# Patient Record
Sex: FEMALE
[De-identification: names, ages, dates, MRNs, and addresses within clinical notes are randomized per-mention and may not be internally consistent; named-entity substitution may affect disease eponyms.]

---

## 2019-01-11 ENCOUNTER — HOSPITAL ENCOUNTER (EMERGENCY)
Dept: HOSPITAL 14 - H.EROB2 | Age: 29
Discharge: HOME | End: 2019-01-11
Payer: COMMERCIAL

## 2019-01-11 VITALS
TEMPERATURE: 98.1 F | DIASTOLIC BLOOD PRESSURE: 78 MMHG | OXYGEN SATURATION: 98 % | SYSTOLIC BLOOD PRESSURE: 125 MMHG | HEART RATE: 94 BPM

## 2019-01-11 DIAGNOSIS — Z3A.39: ICD-10-CM

## 2019-01-11 DIAGNOSIS — O26.93: Primary | ICD-10-CM

## 2019-01-11 DIAGNOSIS — R10.2: ICD-10-CM

## 2019-01-11 NOTE — OBHP
===========================

Datetime: 01/11/2019 15:54

===========================

   

IP Adm Impression:  Term, intrauterine pregnancy; No Active Labor

IP Admit Plan:  Observation/Evaluation; Discharge home

Admit Comment, IP Provider:  pt was mnitored in [facility] and no cervical changes noted in over  hrs and l
ess pains D/C home with instrtuctions and will follow next wk in office 

   Call immediatly if increased pains ROM bleeding or UC Understands and agreed

Extremities - PN:  Normal

Abdomen - PN:  Abnormal

Back - PN:  Normal

Breast - PN:  Not Done

Lungs - PN:  Normal

Thyroid - PN:  Not Done

Neurologic - PN:  Not Done

HEENT - PN:  Normal

General - PN:  Normal

FHR - Baseline A Provider:  140/150

Membranes, Provider:  Intact

Contraction Comments Provider:  irreg

Comments, ACOG Physical Exam:  Abd gravid NT fundus at term NT, ext no calf tenderness

Pool Provider:  Negative

IP Prenatal Hx Assessment:  The Prenatal History has been Reviewed and is Current

EGA AdmitDate IP:  39.5

Vital Signs Provider:  Reviewed

IP Chief Complaint:  Uterine contractions

NICHD Variability Prov Fetus A:  Moderate 6-25bpm

NICHD Accel Fetus A IP Provider:  15X15

NICHD Decel Fetus A IP Provider:  None

Dilatation, Provider:  FT 

Effacement, Provider:  none

Station, Provider:  h

Genitourinary Exam:  Normal

DTRs - PN:  Normal

## 2019-01-17 ENCOUNTER — HOSPITAL ENCOUNTER (INPATIENT)
Dept: HOSPITAL 14 - H.L&D | Age: 29
LOS: 4 days | Discharge: HOME | End: 2019-01-21
Attending: SPECIALIST | Admitting: SPECIALIST
Payer: COMMERCIAL

## 2019-01-17 VITALS — BODY MASS INDEX: 35.9 KG/M2

## 2019-01-17 DIAGNOSIS — O36.63X0: ICD-10-CM

## 2019-01-17 DIAGNOSIS — O48.0: ICD-10-CM

## 2019-01-17 DIAGNOSIS — J45.909: ICD-10-CM

## 2019-01-17 DIAGNOSIS — O61.9: ICD-10-CM

## 2019-01-17 DIAGNOSIS — Z3A.40: ICD-10-CM

## 2019-01-17 LAB
BASOPHILS # BLD AUTO: 0 K/UL (ref 0–0.2)
BASOPHILS NFR BLD: 0.3 % (ref 0–2)
EOSINOPHIL # BLD AUTO: 0.1 K/UL (ref 0–0.7)
EOSINOPHIL NFR BLD: 1.1 % (ref 0–4)
ERYTHROCYTE [DISTWIDTH] IN BLOOD BY AUTOMATED COUNT: 15.7 % (ref 11.5–14.5)
HGB BLD-MCNC: 11.9 G/DL (ref 12–16)
LYMPHOCYTES # BLD AUTO: 2.5 K/UL (ref 1–4.3)
LYMPHOCYTES NFR BLD AUTO: 21.7 % (ref 20–40)
MCH RBC QN AUTO: 29.1 PG (ref 27–31)
MCHC RBC AUTO-ENTMCNC: 32.7 G/DL (ref 33–37)
MCV RBC AUTO: 89 FL (ref 81–99)
MONOCYTES # BLD: 0.6 K/UL (ref 0–0.8)
MONOCYTES NFR BLD: 5.3 % (ref 0–10)
NEUTROPHILS # BLD: 8.2 K/UL (ref 1.8–7)
NEUTROPHILS NFR BLD AUTO: 71.6 % (ref 50–75)
NRBC BLD AUTO-RTO: 0.1 % (ref 0–0)
PLATELET # BLD: 326 K/UL (ref 130–400)
PMV BLD AUTO: 9.4 FL (ref 7.2–11.7)
RBC # BLD AUTO: 4.1 MIL/UL (ref 3.8–5.2)
WBC # BLD AUTO: 11.4 K/UL (ref 4.8–10.8)

## 2019-01-17 PROCEDURE — 4A1HXCZ MONITORING OF PRODUCTS OF CONCEPTION, CARDIAC RATE, EXTERNAL APPROACH: ICD-10-PCS | Performed by: SPECIALIST

## 2019-01-18 RX ADMIN — SIMETHICONE CHEW TAB 80 MG SCH MG: 80 TABLET ORAL at 21:53

## 2019-01-18 RX ADMIN — CEFOXITIN SODIUM SCH MLS/HR: 1 INJECTION, SOLUTION INTRAVENOUS at 19:07

## 2019-01-18 NOTE — OBDS
===================================

DELIVERY PERSONNEL

===================================

   

Delivery Doctor:  JEMAL Denny MD

Circulator:  Radha Bryant RN

Anesthesiologist:  DR ERICA GONZALES

Resident:  Dr Ruelas OB FELLOW

   

===================================

MATERNAL INFORMATION

===================================

   

Delivery Anesthesia:  Spinal

Estimated  Blood Loss (ml):  800

Maternal Complications:  None

Provider Comments:  see dictated surgeons note

   

===================================

LABOR SUMMARY

===================================

   

EDC:  2019 00:00

No. Babies in Womb:  1

   

===================================

LABOR INFORMATION

===================================

   

Reason for Induction:  Postterm; Indicated by Fetal Testing

Cervical Ripening Agents:  Cervidil (Annotations: Inserted by Dr. Denny )

Oxytocin:  N/A

Group B Beta Strep:  Negative

Antibiotics # of Doses:  0

Antibiotics Time of Last Dose:  0

Steroids Given:  None

Reason Steroids Not Administered:  Not Applicable

   

===================================

MEMBRANES

===================================

   

Membranes Rupture Method:  Spontaneous

Rupture of Membranes:  2019 03:30

Length of Rupture (hrs):  7.98

Amniotic Fluid Color:  Clear

Amniotic Fluid Amount:  Large

Amniotic Fluid Odor:  Normal

   

===================================

STAGES OF LABOR

===================================

   

Stage 3 hrs:  0

Stage 3 min:  1

   

===================================

VAGINAL DELIVERY

===================================

   

Episiotomy:  None

Laceration Extension:  N/A

Laceration Type:  None

Laceration Repair:  Not Applicable

Sponge Count Correct:  Yes

Sharps Count Correct:  Yes

Count Comment:  count correct x3

   

===================================

CSECTION DELIVERY

===================================

   

Primary Indication:  Nonreassuring Fetal Status

Secondary Indication:  Failed Induction

CSection Urgency:  Elective

CSection Incidence:  Primary

Labor:  Labor

Elective:  Elective

CSection Incision:  Lower Uterine Transverse

Uterine Closure:  Double-layer closure

   

===================================

BABY A INFORMATION

===================================

   

Infant Delivery Date/Time:  2019 11:29

Method of Delivery:  

Born in Route :  No

:  N/A

Forceps:  N/A

Vacuum Extraction:  Successful

Shoulder Dystocia :  No

   

===================================

ASSISTED DELIVERY BABY A

===================================

   

Catheter Prior to Procedure:  Yes

Vacuum Number of Pulls:  0

Vacuum Number of PopOffs:  1

   

===================================

SHOULDER DYSTOCIA BABY A

===================================

   

Infant Delivery Date/Time:  2019 11:29

   

===================================

PRESENTATION/POSITION BABY A

===================================

   

Presentation:  Cephalic

Cephalic Presentation:  Vertex

Breech Presentation:  N/A

   

===================================

PLACENTA INFORMATION BABY A

===================================

   

Placenta Delivery Time :  2019 11:30

Placenta Method of Delivery:  Expressed

Placenta Status:  Delivered

   

===================================

APGAR SCORES BABY A

===================================

   

Heart Rate 1 min:  >100 bpm

Resp Effort 1 min:  Good Cry

Reflex Irritability 1 min:  Cough or Sneeze or Pulls Away

Muscle Tone 1 min:  Active Motion

Color 1 min:  Body Pink, Extremities Blue

APGAR SCORE 1 MIN:  9

Heart Rate 5 min:  >100 bpm

Resp Effort 5 min:  Good Cry

Reflex Irritability 5 min:  Cough or Sneeze or Pulls Away

Muscle Tone 5 min:  Active Motion

Color 5 min:  Body Pink, Extremities Blue

APGAR SCORE 5 MIN:  9

   

===================================

INFANT INFORMATION BABY A

===================================

   

Gestational Age at Delivery:  40.5

Gestational Status:  Term

Infant Outcome :  Liveborn

Infant Condition :  Stable

Infant Sex:  Male

   

===================================

IDENTIFICATION/MEDS BABY A

===================================

   

ID Band Number:  [id number]

ID Band Location:  Left Leg; Left Arm



   

===================================

WEIGHT/LENGTH BABY A

===================================

   

Infant Birthweight (gms):  3730

Infant Weight (lb):  8

Infant Weight (oz):  4

Infant Length Inches:  20.00

Infant Length cms:  50.8

   

===================================

CORD INFORMATION BABY A

===================================

   

No. Cord Vessels:  3

Nuchal Cord :  N/A

Nuchal Cord Other:  0

True Knot:  0

Cord Blood Taken:  Yes

Infant Suction:  Mouth; Nose

## 2019-01-19 LAB
ERYTHROCYTE [DISTWIDTH] IN BLOOD BY AUTOMATED COUNT: 15.9 % (ref 11.5–14.5)
HGB BLD-MCNC: 10.3 G/DL (ref 12–16)
MCH RBC QN AUTO: 28.9 PG (ref 27–31)
MCHC RBC AUTO-ENTMCNC: 33 G/DL (ref 33–37)
MCV RBC AUTO: 87.5 FL (ref 81–99)
PLATELET # BLD: 258 K/UL (ref 130–400)
RBC # BLD AUTO: 3.56 MIL/UL (ref 3.8–5.2)
WBC # BLD AUTO: 14.3 K/UL (ref 4.8–10.8)

## 2019-01-19 RX ADMIN — SIMETHICONE CHEW TAB 80 MG SCH MG: 80 TABLET ORAL at 21:00

## 2019-01-19 RX ADMIN — SIMETHICONE CHEW TAB 80 MG SCH MG: 80 TABLET ORAL at 09:39

## 2019-01-19 RX ADMIN — CEFOXITIN SODIUM SCH MLS/HR: 1 INJECTION, SOLUTION INTRAVENOUS at 01:05

## 2019-01-19 RX ADMIN — MULTIPLE VITAMINS W/ MINERALS TAB SCH TAB: TAB at 08:10

## 2019-01-19 RX ADMIN — SIMETHICONE CHEW TAB 80 MG SCH MG: 80 TABLET ORAL at 08:10

## 2019-01-19 RX ADMIN — SIMETHICONE CHEW TAB 80 MG SCH MG: 80 TABLET ORAL at 03:17

## 2019-01-19 RX ADMIN — CEFOXITIN SODIUM SCH MLS/HR: 1 INJECTION, SOLUTION INTRAVENOUS at 09:15

## 2019-01-19 RX ADMIN — SIMETHICONE CHEW TAB 80 MG SCH MG: 80 TABLET ORAL at 18:11

## 2019-01-19 NOTE — OBPPN
===========================

Datetime: 01/19/2019 12:50

===========================

   

PP Pain Prov:  Within normal limits

PP Pain Prov comment:  No SOB, chest or leg pains

PP Nausea Prov:  Denies

PP Flatus Prov:  Yes

PP BM Prov:  No

PP Nausea Prov comment:  voided well

PP Breasts Prov:  Normal

PP Lungs Prov:  Normal

PP Abdomen/Uterus Prov:  Abnormal

PP Lochia Prov:  Normal

PP Vulva/Perineum Prov:  Normal

PP CVA Tenderness Prov:  Normal

PP Extremities Prov:  Normal

PP C/S Incision Prov:  Normal

PP Breastfeeding Progress Prov:  Normal

PP Comments Phys Exam Prov:  breast nt, ne, breast feeding; Abd soft ND, fundus firm below the umb In
cision clean and dry no suppt or discharge sutures in placel  Ext no calf tenderness.  

PP Impression Prov:  Normal postpartum progression

PP Plan Prov:  Continue present management

PP Progress Note Prov:  OOB and ambulation, increase diet as tolerated.  Continue po care.

Vital Signs Provider PP:  Reviewed

## 2019-01-19 NOTE — OP
PROCEDURE DATE:  2019



PREOPERATIVE DIAGNOSES:

1.  Pregnancy at 40 plus weeks gestation.

2.  Nonreassuring fetal tracing.

3.  Failed induction.



POSTOPERATIVE DIAGNOSES:

1.  Pregnancy at 40 plus weeks gestation.

2.  Nonreassuring fetal tracing.

3.  Failed induction.



PROCEDURE PERFORMED:  Primary low transverse segment  section.



SURGEON:  Po Denny MD



ASSISTANT:  Niko Ruelas MD



TYPE OF ANESTHESIA:  Spinal.



ANESTHESIA ADMINISTERED BY:  Darion Garces MD



ESTIMATED BLOOD LOSS:  800 mL.



DRAINS USED:  None.



REPLACEMENT USED:  None.



FINDINGS:

1.  Delivered living boy baby, baby appears large for gestational age. 

Baby cried spontaneously.  Pediatrist in attendance.  Apgar score of 9 and

9.

2.  Amniotic fluid clear.

3.  Placenta complete and intact.

4.  Both tubes and ovaries appeared grossly within normal limits to

inspection bilaterally.



DESCRIPTION OF PROCEDURE:  The patient was taken to the operating room and

placed on the operating table in a supine position.  Following the

induction of spinal anesthesia, the Castle catheter was inserted into the

bladder and was draining clear fluid.  Venodyne boots were applied to both

legs and the abdomen was then draped and prepped in a usual sterile manner.

Anesthesia tested and found to be well secured and a Pfannenstiel incision

was then made using sharp dissection 2 fingerbreadths above the symphysis

pubis.  The incision was then extended down to subcutaneous tissue also

using sharp dissection.  At this time, we then proceeded to obtain

hemostasis by means of electrocoagulation.  The fascia was then identified,

was then entered in the midline.  Incision of the fascia was then extended

laterally on each direction.  Following this, the rectus muscle was then

identified, was then slit in the midline exposing the peritoneum. 

Peritoneal layer was then picked up using 2 Farhana clamps, retracted

superiorly and then entered using sharp dissection.  Incision of the

peritoneum was then extended superiorly and inferiorly under direct

visualization.  At this time, we then proceeded to identify the bladder,

which was then retracted inferiorly using a Navarro retractor.  The low

transverse segment of the uterus was then identified, was then entered

using sharp dissection.  Using blunt dissection, a bladder flap was then

created and retracted inferiorly using the same Navarro retractor. 

Following this, we then proceeded to make an incision in a low transverse

segment of the uterus.  Upon entering the uterine cavity, clear fluid noted

to be present.  The incision was then extended laterally in each direction

using bandage scissors.  Using amnioscopy procedure, a living baby boy was

then delivered.  The baby appears term, but large for gestational age.  The

baby cried spontaneously.  It was aspirated using bulb suction, the

umbilicus was then doubly clamped and cut, and the baby handled to the

pediatric personnel who was standing by.  Samples of cord blood were then

obtained and the placenta was then delivered complete and intact.  Edges of

the uterine incision were then secured using multiple T-clamps.  The uterus

had been exteriorized to provide better visualization.  The uterine cavity

was then thoroughly cleaned using moist lap pad.  The uterus was massaged

and contracted well.  The uterine incision was then approximated using 0

Vicryl suture in a continuous interlocking manner.  At this time, we then

proceeded to place a second layer also using 0 Vicryl suture in a

continuous manner.  Hemostasis checked and found to be well secured. 

Following this, the bladder flap was then approximated using a 2-0 Vicryl

in a continuous manner.  Following this, all operative areas checked,

hemostatically secured.  Free amniotic fluid and blood were evacuated from

the pelvic cavity  The pelvic cavity was then irrigated using saline

solution and the uterus was then allowed to retract back into its original

position.  At this time, all operative areas checked, hemostatically

secured.  Both tubes and ovaries appeared grossly within normal limits to

inspection bilaterally.  The peritoneum was then closed using 0 Vicryl

suture in a continuous manner.  Rectus muscle was then approximated in the

midline using interrupted 0 Vicryl suture.  Fascia was then identified and

was then approximated using 0 Vicryl suture in a continuous manner.  Fascia

was then checked and found to be free of defect.  Subcutaneous tissue was

then irrigated using saline solution and approximated using several

interrupted 2-0 plain sutures.  Skin was then approximated using a 3-0

Prolene in a subcuticular fashion.  Steri-Strips were then applied.  Clear

fluid noted to be present in the Castle bag at this time.  The patient

tolerated the procedure well.  There were no complications.  Sponge,

instrument, and needle count correct x3.  The patient now was then

transferred to the recovery room in satisfactory condition.



 



__________________________________________

Po Denny MD





DD:  2019 9:21:47

DT:  2019 10:06:30

Job # [id number]

## 2019-01-20 RX ADMIN — SIMETHICONE CHEW TAB 80 MG SCH MG: 80 TABLET ORAL at 21:09

## 2019-01-20 RX ADMIN — MULTIPLE VITAMINS W/ MINERALS TAB SCH TAB: TAB at 14:43

## 2019-01-20 RX ADMIN — SIMETHICONE CHEW TAB 80 MG SCH: 80 TABLET ORAL at 09:04

## 2019-01-20 RX ADMIN — SIMETHICONE CHEW TAB 80 MG SCH MG: 80 TABLET ORAL at 10:10

## 2019-01-20 RX ADMIN — SIMETHICONE CHEW TAB 80 MG SCH MG: 80 TABLET ORAL at 16:45

## 2019-01-20 NOTE — OBPPN
===========================

Datetime: 01/20/2019 12:06

===========================

   

PP Pain Prov:  Within normal limits

PP Pain Prov comment:  no SOB, chest or leg pains

PP Nausea Prov:  Denies

PP Flatus Prov:  Yes

PP BM Prov:  Yes

PP Breasts Prov:  Normal

PP Lungs Prov:  Normal

PP Abdomen/Uterus Prov:  Abnormal

PP Lochia Prov:  Normal

PP Vulva/Perineum Prov:  Normal

PP CVA Tenderness Prov:  Normal

PP Extremities Prov:  Normal

PP C/S Incision Prov:  Normal

PP Breastfeeding Progress Prov:  Normal

PP Comments Phys Exam Prov:  breast NE, NT breast feeding; Abd soft ND depressible fundus firm at umb
 Incision clean and dry no active bleeding or sign of infection Ext no calf tenderness

PP Impression Prov:  Normal postpartum progression

PP Plan Prov:  Continue present management

PP Progress Note Prov:  Continue po care OOB and ambulation

IP PP Procedures:  None

Vital Signs Provider PP:  Reviewed

## 2019-01-21 VITALS
TEMPERATURE: 98.4 F | DIASTOLIC BLOOD PRESSURE: 80 MMHG | HEART RATE: 96 BPM | RESPIRATION RATE: 20 BRPM | OXYGEN SATURATION: 98 % | SYSTOLIC BLOOD PRESSURE: 123 MMHG

## 2019-01-21 RX ADMIN — SIMETHICONE CHEW TAB 80 MG SCH: 80 TABLET ORAL at 10:00

## 2019-01-21 RX ADMIN — MULTIPLE VITAMINS W/ MINERALS TAB SCH TAB: TAB at 08:19

## 2019-01-21 NOTE — OBDCSUM
===========================

Datetime: 01/21/2019 08:34

===========================

   

Discharged to, Provider:  Home

Follow up at, Provider:  [facility] Instr Activity:  Normal activity; Bedrest; May be up to bathroom; May be up for meals; May Show
er

Disch Instr Diet:  Regular

Discharge Instructions, Provider:  Routine instructions given

Discharge Diagnosis, Provider:  Term Pregnancy Delivered

Follow up in weeks, Provider:  1week in office

Disch Referrals:  None

Disch Activity Restrictions:  No exercising; No lifting; No driving; Minimize walking; Minimize stair
-climbing; No sexual activity; Nothing in vagina - [place], tampons, douche

Discharge Comment, Provider:  laron home today rto 1week call office if any problems

Contraception after Delivery:  Undecided